# Patient Record
Sex: MALE | Race: WHITE | NOT HISPANIC OR LATINO | Employment: UNEMPLOYED | URBAN - METROPOLITAN AREA
[De-identification: names, ages, dates, MRNs, and addresses within clinical notes are randomized per-mention and may not be internally consistent; named-entity substitution may affect disease eponyms.]

---

## 2017-02-02 ENCOUNTER — TELEPHONE (OUTPATIENT)
Dept: FAMILY MEDICINE CLINIC | Facility: CLINIC | Age: 67
End: 2017-02-02

## 2017-12-08 ENCOUNTER — OFFICE VISIT (OUTPATIENT)
Dept: FAMILY MEDICINE CLINIC | Facility: CLINIC | Age: 67
End: 2017-12-08

## 2017-12-08 VITALS
RESPIRATION RATE: 16 BRPM | HEART RATE: 67 BPM | TEMPERATURE: 97.7 F | SYSTOLIC BLOOD PRESSURE: 150 MMHG | OXYGEN SATURATION: 98 % | HEIGHT: 73 IN | WEIGHT: 187.6 LBS | DIASTOLIC BLOOD PRESSURE: 92 MMHG | BODY MASS INDEX: 24.86 KG/M2

## 2017-12-08 DIAGNOSIS — Z23 NEED FOR INFLUENZA VACCINATION: Primary | ICD-10-CM

## 2017-12-08 DIAGNOSIS — T14.8XXA MUSCLE STRAIN: ICD-10-CM

## 2017-12-08 PROCEDURE — 90662 IIV NO PRSV INCREASED AG IM: CPT | Performed by: NURSE PRACTITIONER

## 2017-12-08 PROCEDURE — 99213 OFFICE O/P EST LOW 20 MIN: CPT | Performed by: NURSE PRACTITIONER

## 2017-12-08 PROCEDURE — G0008 ADMIN INFLUENZA VIRUS VAC: HCPCS | Performed by: NURSE PRACTITIONER

## 2017-12-08 RX ORDER — DICLOFENAC SODIUM 75 MG/1
75 TABLET, DELAYED RELEASE ORAL 2 TIMES DAILY
Qty: 60 TABLET | Refills: 0 | Status: SHIPPED | OUTPATIENT
Start: 2017-12-08 | End: 2018-05-30

## 2017-12-08 NOTE — PROGRESS NOTES
Subjective   Ar Sherman is a 67 y.o. male.   Chief Complaint   Patient presents with   • Muscle Pain     pt states left chest muscle pain and thinks it happened while working out at the gym off and on for awhile      Vitals:    12/08/17 1311   BP: 150/92   Pulse: 67   Resp: 16   Temp: 97.7 °F (36.5 °C)   SpO2: 98%     No LMP for male patient.    History of Present Illness  Ar is a patient of Dr Dougherty who is here for an acute visit. He c/o left chest muscle pain after working out a few weeks ago. It is painful with movement and picking up things. He has used heat with some relief.  He reports he just finished a steroid pack for a sinus infection     The following portions of the patient's history were reviewed and updated as appropriate: allergies, current medications, past family history, past medical history, past social history, past surgical history and problem list.    Review of Systems   Constitutional: Negative.    Respiratory: Negative for cough, chest tightness, shortness of breath and wheezing.    Cardiovascular: Negative for chest pain.   Musculoskeletal: Positive for myalgias (muscle strain of left chest, painful with movement and lifting ).       Objective   Physical Exam   Constitutional: Vital signs are normal. He appears well-developed and well-nourished. No distress.   Cardiovascular: Normal rate.    Pulmonary/Chest: Effort normal. He exhibits tenderness. He exhibits no mass, no crepitus, no edema and no swelling.       Neurological: He is alert.       Assessment/Plan   Ar was seen today for muscle pain.    Diagnoses and all orders for this visit:    Need for influenza vaccination  -     Flu Vaccine High Dose PF 65YR+    Muscle strain    Other orders  -     diclofenac (VOLTAREN) 75 MG EC tablet; Take 1 tablet by mouth 2 (Two) Times a Day. As needed for inflammation. Take with food      Alternate heat and ice  Diclofenac as needed with food  Avoid heaving lifting or weight lifting for  now  He is due for labs and a follow up with Dr Dougherty, recommend that he schedule an appt  Follow up as needed and for continual care

## 2018-05-22 ENCOUNTER — TELEPHONE (OUTPATIENT)
Dept: FAMILY MEDICINE CLINIC | Facility: CLINIC | Age: 68
End: 2018-05-22

## 2018-05-22 NOTE — TELEPHONE ENCOUNTER
5/21/18- I spoke with Mrs. Sherman who stated that patients BP was elevated at 195 while he was at the dentist and his dentist instructed him to call us so he could be seen immediately. I informed her that Dr. Dougherty was not here at the time & there were no openings for him to be worked in. I asked her how he was feeling and she said he felt fine. No other symptoms. I advised her to tell him to go home, take his BP again (she said they have a BP cuff) to see if it came down at all and if so then there wasn't anything else he needed to do but, if it did not go down or if he started feeling badly that he should go to the ER. She understood.   Mrs. Sherman called back a few minutes after me speaking to her insisting that we work patient in to see Dr. Dougherty. She was made aware that he was gone for the day and we would have to get back to them.  5/22/18- Mrs. Sherman called again wanting to know when patient was going to be worked in today. She said his BP came down after he got home yesterday and today it was 126/86 and he feels fine. I let her know again, that we did not have any openings to work him in today and this was not an emergency because his BP is now normal. I told her the first we had available was 5/30/18 with Dr. Dougherty. I advised her that the patient check his BP at least once daily and keep a log of it and bring it with him to his appointment on 5/30/18.     ----- Message from Cordelia Marcos MA sent at 5/21/2018  3:25 PM EDT -----  Patients wife called and wanted to be worked in Tuesday may 22nd due to elevated blood pressure at his dentist appt . Please review and advise

## 2018-05-30 ENCOUNTER — OFFICE VISIT (OUTPATIENT)
Dept: FAMILY MEDICINE CLINIC | Facility: CLINIC | Age: 68
End: 2018-05-30

## 2018-05-30 VITALS
HEIGHT: 73 IN | DIASTOLIC BLOOD PRESSURE: 90 MMHG | SYSTOLIC BLOOD PRESSURE: 140 MMHG | HEART RATE: 95 BPM | RESPIRATION RATE: 17 BRPM | OXYGEN SATURATION: 98 % | WEIGHT: 187 LBS | BODY MASS INDEX: 24.78 KG/M2

## 2018-05-30 DIAGNOSIS — R09.89 LABILE HYPERTENSION: Primary | ICD-10-CM

## 2018-05-30 PROCEDURE — 99213 OFFICE O/P EST LOW 20 MIN: CPT

## 2018-05-30 RX ORDER — BISOPROLOL FUMARATE AND HYDROCHLOROTHIAZIDE 2.5; 6.25 MG/1; MG/1
1 TABLET ORAL DAILY
Qty: 30 TABLET | Refills: 5 | Status: SHIPPED | OUTPATIENT
Start: 2018-05-30 | End: 2018-11-20 | Stop reason: SDUPTHER

## 2018-05-30 NOTE — PROGRESS NOTES
Ramos Sherman is a 68 y.o. male. Patient is here today for   Chief Complaint   Patient presents with   • Hypertension     had an elevated blood pressure in dentist office          Vitals:    05/30/18 1041   BP: 140/90   Pulse: 95   Resp: 17   SpO2: 98%     The following portions of the patient's history were reviewed and updated as appropriate: allergies, current medications, past family history, past medical history, past social history, past surgical history and problem list.    No past medical history on file.   No Known Allergies   Social History     Social History   • Marital status:      Spouse name: N/A   • Number of children: N/A   • Years of education: N/A     Occupational History   • Not on file.     Social History Main Topics   • Smoking status: Never Smoker   • Smokeless tobacco: Never Used   • Alcohol use No   • Drug use: No   • Sexual activity: Yes     Partners: Female     Other Topics Concern   • Not on file     Social History Narrative   • No narrative on file        Current Outpatient Prescriptions:   •  bisoprolol-hydrochlorothiazide (ZIAC) 2.5-6.25 MG per tablet, Take 1 tablet by mouth Daily., Disp: 30 tablet, Rfl: 5     Objective     History of Present Illness   The patient is here today for evaluation of labile blood pressure, significant elevation at his dentist's office    Review of Systems   Constitutional: Negative.    HENT: Negative.    Respiratory: Negative for cough, shortness of breath and wheezing.    Cardiovascular: Negative for chest pain, palpitations and leg swelling.   Gastrointestinal: Negative.    Genitourinary:        The patient does have some mild urinary urgency and frequency which is suggestive of overactive bladder syndrome.  The patient denies urinary hesitancy.  The patient is being followed by urology for history of elevated PSA off and on   Musculoskeletal: Negative.    Neurological: Negative for dizziness, light-headedness and headaches.    Psychiatric/Behavioral: Negative.        Physical Exam   Constitutional: He is oriented to person, place, and time. He appears well-developed and well-nourished.   Eyes: Pupils are equal, round, and reactive to light.   Neck:   Carotid pulses normal   Cardiovascular: Normal rate, regular rhythm and normal heart sounds.    Pulmonary/Chest: Effort normal and breath sounds normal.   Abdominal: Soft. Bowel sounds are normal.   Musculoskeletal: Normal range of motion.   Neurological: He is alert and oriented to person, place, and time.   Skin: Skin is warm and dry.   Psychiatric: He has a normal mood and affect.   Nursing note and vitals reviewed.      ASSESSMENT   #1 labile  Hypertension    DISCUSSION/SUMMARY   Today the patient's blood pressure is 140/90.  When he was at the [facility] for dental work patient's blood pressure was noted to be quite elevated at 160s over 110.  The patient has noted that his blood pressures been somewhat labile over the years.  He  has never been on any kind of medication for his blood pressure elevation.  The patient has been checking his blood pressure at home since this elevation was found at the dentist office.  His blood pressures range from normal to moderately elevated.  Today his blood pressures about 140/90 with a normal pulse rate.  He seems to have a labile hypertension and I am going to start him on generic Ziac 2.5 one daily.  The patient will monitor his blood pressure and pulse rate at home for several weeks and drop his readings off here at the office for my evaluation.    PLAN  Bring blood pressure readings in for my evaluation and review in about 3 weeks.  Later we will set him up for CMP and lipid panel  No Follow-up on file.

## 2018-06-08 ENCOUNTER — TELEPHONE (OUTPATIENT)
Dept: FAMILY MEDICINE CLINIC | Facility: CLINIC | Age: 68
End: 2018-06-08

## 2018-06-08 NOTE — TELEPHONE ENCOUNTER
I called patient last week (6/1/18) and had to leave a voicemail; did not receive a call back.    ----- Message from Cody Dougherty MD sent at 5/31/2018 11:35 AM EDT -----  Tell patient that the medication only has a very tiny dose of a mild diuretic in it.  I think he should let us frequency as time goes by but if this does not improve I will change him to plain bisoprolol without the HCT next week  ----- Message -----  From: Alyce Manzano MA  Sent: 5/31/2018  11:16 AM  To: Cody Dougherty MD        ----- Message -----  From: Griselda Rowell  Sent: 5/31/2018  11:12 AM  To: Alyce Manzano MA    WAS GIVEN A NEW MEDICATION ( BISOPROLOL)YESTERDAY AND A SIDE EFFECT IS GOING TO THE BATHROOM   A LOT  AND HE SAYS HE IS GOING A LOT MORE AND WANTS TO KNOW IF HIS BODY WILL GET USE TO THE MEDICATION AND HE WILL STOP HAVING THIS SIDE EFFECT OR SHOULD ANOTHER RX BE CALLED IN     PLEASE CALL PT TO DISCUSS     [phone]

## 2018-11-21 RX ORDER — BISOPROLOL FUMARATE AND HYDROCHLOROTHIAZIDE 2.5; 6.25 MG/1; MG/1
TABLET ORAL
Qty: 14 TABLET | Refills: 0 | Status: SHIPPED | OUTPATIENT
Start: 2018-11-21 | End: 2018-12-03 | Stop reason: SDUPTHER

## 2018-12-03 RX ORDER — BISOPROLOL FUMARATE AND HYDROCHLOROTHIAZIDE 2.5; 6.25 MG/1; MG/1
1 TABLET ORAL DAILY
Qty: 30 TABLET | Refills: 0 | Status: SHIPPED | OUTPATIENT
Start: 2018-12-03 | End: 2018-12-03 | Stop reason: SDUPTHER

## 2018-12-03 RX ORDER — BISOPROLOL FUMARATE AND HYDROCHLOROTHIAZIDE 2.5; 6.25 MG/1; MG/1
1 TABLET ORAL DAILY
Qty: 30 TABLET | Refills: 0 | Status: SHIPPED | OUTPATIENT
Start: 2018-12-03 | End: 2018-12-04 | Stop reason: SDUPTHER

## 2018-12-04 ENCOUNTER — TELEPHONE (OUTPATIENT)
Dept: FAMILY MEDICINE CLINIC | Facility: CLINIC | Age: 68
End: 2018-12-04

## 2018-12-04 RX ORDER — BISOPROLOL FUMARATE AND HYDROCHLOROTHIAZIDE 2.5; 6.25 MG/1; MG/1
1 TABLET ORAL DAILY
Qty: 90 TABLET | Refills: 0 | Status: SHIPPED | OUTPATIENT
Start: 2018-12-04 | End: 2019-04-01 | Stop reason: SDUPTHER

## 2018-12-04 NOTE — TELEPHONE ENCOUNTER
I spoke with patient and let him know I sent a refill on his Ziac to his pharmacy yesterday and that Dr. Dougherty wants him to continue this medication. I also let him know he will be due for labs and a follow up soon. He said he will be going out of town until March and I told him that it was OK to wait until he gets back. Sent a 90-day supply today to last him until then.    ----- Message from Valerio Castellanos sent at 12/3/2018 12:48 PM EST -----  PT CALLED IN STATING HE DROPPED OFF BP LOG PER DR MARSHALL IN ORDER TO GET A REFILL ON HIS bisoprolol-hydrochlorothiazide (ZIAC) 2.5-6.25 MG.  I SEE WE GAVE HIM A 14 DAY SUPPLY ON 11/21/18 BUT NO NOTES INDICATING ANYTHING.  PLEASE CONTACT PT -[phone]

## 2018-12-04 NOTE — TELEPHONE ENCOUNTER
Done     ----- Message from Cody Dougherty MD sent at 12/3/2018  3:16 PM EST -----  Tell patient that his blood pressure readings look good so he should continue his present medication.  ----- Message -----  From: Alyce Manzano MA  Sent: 12/3/2018   1:26 PM  To: Cody Dougherty MD

## 2019-04-01 RX ORDER — BISOPROLOL FUMARATE AND HYDROCHLOROTHIAZIDE 2.5; 6.25 MG/1; MG/1
1 TABLET ORAL DAILY
Qty: 90 TABLET | Refills: 0 | Status: SHIPPED | OUTPATIENT
Start: 2019-04-01 | End: 2019-07-05 | Stop reason: SDUPTHER

## 2019-04-01 RX ORDER — BISOPROLOL FUMARATE AND HYDROCHLOROTHIAZIDE 2.5; 6.25 MG/1; MG/1
1 TABLET ORAL DAILY
Qty: 90 TABLET | Refills: 0 | Status: SHIPPED | OUTPATIENT
Start: 2019-04-01 | End: 2019-04-01 | Stop reason: SDUPTHER

## 2019-05-28 ENCOUNTER — OFFICE VISIT (OUTPATIENT)
Dept: FAMILY MEDICINE CLINIC | Facility: CLINIC | Age: 69
End: 2019-05-28

## 2019-05-28 VITALS
SYSTOLIC BLOOD PRESSURE: 146 MMHG | WEIGHT: 183.6 LBS | TEMPERATURE: 98.1 F | BODY MASS INDEX: 24.33 KG/M2 | DIASTOLIC BLOOD PRESSURE: 96 MMHG | HEIGHT: 73 IN | OXYGEN SATURATION: 98 % | RESPIRATION RATE: 16 BRPM | HEART RATE: 67 BPM

## 2019-05-28 DIAGNOSIS — Z11.59 NEED FOR HEPATITIS C SCREENING TEST: ICD-10-CM

## 2019-05-28 DIAGNOSIS — H93.12 TINNITUS OF LEFT EAR: ICD-10-CM

## 2019-05-28 DIAGNOSIS — Z87.898 HISTORY OF ELEVATED PSA: ICD-10-CM

## 2019-05-28 DIAGNOSIS — Z12.5 ENCOUNTER FOR SCREENING FOR MALIGNANT NEOPLASM OF PROSTATE: ICD-10-CM

## 2019-05-28 DIAGNOSIS — I10 ESSENTIAL HYPERTENSION: Primary | ICD-10-CM

## 2019-05-28 PROCEDURE — 99213 OFFICE O/P EST LOW 20 MIN: CPT

## 2019-05-28 NOTE — PROGRESS NOTES
Subjective   Ar Sherman is a 69 y.o. male. Patient is here today for   Chief Complaint   Patient presents with   • Hypertension          Vitals:    05/28/19 0821   BP: 146/96   Pulse: 67   Resp: 16   Temp: 98.1 °F (36.7 °C)   SpO2: 98%     The following portions of the patient's history were reviewed and updated as appropriate: allergies, current medications, past family history, past medical history, past social history, past surgical history and problem list.    History reviewed. No pertinent past medical history.   No Known Allergies   Social History     Socioeconomic History   • Marital status:      Spouse name: Not on file   • Number of children: Not on file   • Years of education: Not on file   • Highest education level: Not on file   Tobacco Use   • Smoking status: Never Smoker   • Smokeless tobacco: Never Used   Substance and Sexual Activity   • Alcohol use: No   • Drug use: No   • Sexual activity: Yes     Partners: Female        Current Outpatient Medications:   •  bisoprolol-hydrochlorothiazide (ZIAC) 2.5-6.25 MG per tablet, Take 1 tablet by mouth Daily., Disp: 90 tablet, Rfl: 0     Objective     History of Present Illness   The patient is here today for follow-up on  labile hypertension     Review of Systems   Constitutional: Negative for chills, fatigue, fever and unexpected weight change.        The patient does work out on a regular basis.  His diet is good.   HENT:        The patient does have some mild ringing in his left ear.  He denies ear ache.  He really does not have any major upper respiratory allergy symptoms.  No dizziness.   Respiratory: Negative for cough, shortness of breath and wheezing.    Cardiovascular: Negative for chest pain, palpitations and leg swelling.   Gastrointestinal: Negative.    Genitourinary:        Minimal urinary hesitancy and nocturia   Neurological: Negative.    Hematological: Negative.    Psychiatric/Behavioral:        Patient states that he is always been  fairly high strung but has never had to take any type of medication for this.       Physical Exam   Constitutional: He is oriented to person, place, and time. He appears well-developed and well-nourished.   HENT:   Right Ear: External ear normal.   Left Ear: External ear normal.   Nose: Nose normal.   Mouth/Throat: Oropharynx is clear and moist.   Eyes: Pupils are equal, round, and reactive to light.   Neck:   Carotid pulses normal   Cardiovascular: Normal rate, regular rhythm and normal heart sounds.   Pulmonary/Chest: Effort normal and breath sounds normal. No stridor. He has no wheezes. He has no rales.   Musculoskeletal: Normal range of motion. He exhibits no edema.   Neurological: He is alert and oriented to person, place, and time.   Skin: Skin is warm and dry.   Psychiatric: He has a normal mood and affect.   Nursing note and vitals reviewed.      ASSESSMENT  #1 labile hypertension                    #2 history of elevated PSA                #3 tinnitus                      DISCUSSION/SUMMARY   Initially the patient's blood pressure was 146/96.  Upon recheck his blood pressure was down to 140/86.  The patient states that his blood pressure varies quite a lot at home when he checks it.  He usually has readings of 130s systolic and 70s diastolic.    The patient complains of some ringing in his left ear but denies any dizziness.  No significant  hearing loss.  If the patient's tinnitus worsens I will send him to ENT for evaluation.    The patient was sent to urology in 2016 for a mildly elevated PSA and was found to have BPH and a repeat PSA came down to below 4.  He has not had this checked in over a year so we will need to do a PSA today.  I am also going to order a CMP and hepatitis C screening.    PLAN  Continue generic Ziac 2.51 daily.  CMP, hepatitis C and PSA to be drawn today.  No Follow-up on file.

## 2019-05-29 LAB
ALBUMIN SERPL-MCNC: 4.2 G/DL (ref 3.5–5.2)
ALBUMIN/GLOB SERPL: 1.8 G/DL
ALP SERPL-CCNC: 40 U/L (ref 39–117)
ALT SERPL-CCNC: 12 U/L (ref 1–41)
AST SERPL-CCNC: 16 U/L (ref 1–40)
BILIRUB SERPL-MCNC: 0.5 MG/DL (ref 0.2–1.2)
BUN SERPL-MCNC: 23 MG/DL (ref 8–23)
BUN/CREAT SERPL: 18.1 (ref 7–25)
CALCIUM SERPL-MCNC: 10.2 MG/DL (ref 8.6–10.5)
CHLORIDE SERPL-SCNC: 105 MMOL/L (ref 98–107)
CO2 SERPL-SCNC: 28.8 MMOL/L (ref 22–29)
CREAT SERPL-MCNC: 1.27 MG/DL (ref 0.76–1.27)
GLOBULIN SER CALC-MCNC: 2.3 GM/DL
GLUCOSE SERPL-MCNC: 95 MG/DL (ref 65–99)
HCV AB S/CO SERPL IA: <0.1 S/CO RATIO (ref 0–0.9)
HCV AB SERPL QL IA: NORMAL
POTASSIUM SERPL-SCNC: 4.6 MMOL/L (ref 3.5–5.2)
PROT SERPL-MCNC: 6.5 G/DL (ref 6–8.5)
PSA SERPL-MCNC: 4.33 NG/ML (ref 0–4)
SODIUM SERPL-SCNC: 144 MMOL/L (ref 136–145)

## 2019-06-17 ENCOUNTER — TELEPHONE (OUTPATIENT)
Dept: FAMILY MEDICINE CLINIC | Facility: CLINIC | Age: 69
End: 2019-06-17

## 2019-06-17 NOTE — TELEPHONE ENCOUNTER
CALLED AND S/W PT AND ADVISED WHAT DR. DELVALLE SAID. PT VOICED UNDERSTANDING AND WILL MAKE SURE HE RECHECKS YEARLY.     ----- Message from Cody Delvalle MD sent at 6/14/2019  2:23 PM EDT -----  I cannot remember if the patient was informed of results of his lab test.  Tell him that his lab work was normal except for minimal elevation of his PSA again.  His PSA was borderline elevated at 4.330 which is down from 5.410  2 years ago.  Tell him that he needs to repeat his PSA every year.

## 2019-07-05 ENCOUNTER — TELEPHONE (OUTPATIENT)
Dept: FAMILY MEDICINE CLINIC | Facility: CLINIC | Age: 69
End: 2019-07-05

## 2019-07-05 RX ORDER — BISOPROLOL FUMARATE AND HYDROCHLOROTHIAZIDE 2.5; 6.25 MG/1; MG/1
1 TABLET ORAL DAILY
Qty: 90 TABLET | Refills: 0 | Status: SHIPPED | OUTPATIENT
Start: 2019-07-05 | End: 2019-10-02 | Stop reason: SDUPTHER

## 2019-07-05 NOTE — TELEPHONE ENCOUNTER
SENT RX TO PHARMACY FOR PATIENT.     ----- Message from Valerio Castellanos sent at 7/3/2019  3:28 PM EDT -----  PT NEEDS SCRIPT REFILL FOR bisoprolol-hydrochlorothiazide (ZIAC) 2.5-6.25 MG Take 1 tablet by mouth Daily #90    PT NEEDS SCRIPT SENT TO [facility] ON [address] [place] [facility] [place]     IF YOU HAVE ANY QUESTIONS PLEASE CONTACT PT -[phone]

## 2019-10-02 RX ORDER — BISOPROLOL FUMARATE AND HYDROCHLOROTHIAZIDE 2.5; 6.25 MG/1; MG/1
1 TABLET ORAL DAILY
Qty: 90 TABLET | Refills: 0 | Status: SHIPPED | OUTPATIENT
Start: 2019-10-02 | End: 2019-12-05 | Stop reason: SDUPTHER

## 2019-12-05 ENCOUNTER — TELEPHONE (OUTPATIENT)
Dept: INTERNAL MEDICINE | Facility: CLINIC | Age: 69
End: 2019-12-05

## 2019-12-05 RX ORDER — BISOPROLOL FUMARATE AND HYDROCHLOROTHIAZIDE 2.5; 6.25 MG/1; MG/1
1 TABLET ORAL DAILY
Qty: 90 TABLET | Refills: 1 | Status: SHIPPED | OUTPATIENT
Start: 2019-12-05

## 2019-12-05 NOTE — TELEPHONE ENCOUNTER
Suzi,    Per our conversation earlier Mr. Sherman request a refill for bisoprolol-hydrochlorothiazide (ZIAC) 2.5-6.25 MG per tablet. He is almost out and has no refills. The patient will be leaving soon for [place] and will not return until March 2020. He is now scheduled for labs 03/11/20 and f/u appt to see Marlen 03/18/20. Let me know if you need anything else.    Thanks,    Renuka

## 2020-02-03 ENCOUNTER — TELEPHONE (OUTPATIENT)
Dept: INTERNAL MEDICINE | Facility: CLINIC | Age: 70
End: 2020-02-03

## 2020-02-03 NOTE — TELEPHONE ENCOUNTER
Patient called, he is currently in [place]. He was scheduled to come back to [place] in March but now won't be back until May due to having radiation every day.  His blood pressure has been elevated, and she started taking his BP medications twice daily as previously instructed by doctor Farhat.   He said his BP is not under control since he has been taking his Bisoprolol/Htcz twice daily. He will be running out of his medication sooner and wanted us to call in a new prescription to his pharmacy for twice daily.  I spoke with Marlen MURO and unfortunately she can not send in a prescription for the patient since she has not seen him in over two years, and has not seen him for HTN.   I called the patient, apologized and notified him. He was not very happy since he could not help his circumstances, I told him he can go to a clinic in [place] while he is there to get the prescription for twice a day and once he is back in [place] we will see him and write it for him. He did not like that answer. He said once he comes back to [place], he will find himself a new physician.

## 2020-03-05 DIAGNOSIS — R09.89 LABILE HYPERTENSION: Primary | ICD-10-CM

## 2020-03-11 ENCOUNTER — RESULTS ENCOUNTER (OUTPATIENT)
Dept: INTERNAL MEDICINE | Facility: CLINIC | Age: 70
End: 2020-03-11

## 2020-03-11 DIAGNOSIS — R09.89 LABILE HYPERTENSION: ICD-10-CM

## 2022-11-08 NOTE — TELEPHONE ENCOUNTER
LAB ORDER FAXED    ----- Message from Suzi Barraza MA sent at 2/1/2017 10:18 AM EST -----  NEEDS LAB ORDER FOR PSA FAXED TO [facility] IN [place]   FAX : [phone]    PLEASE CALL HIM WHEN YOU HAVE FAXED IT SO HE CAN GO OVER THERE.     THANK YOU      no rashes , no suspicious lesions , no areas of discoloration , no jaundice present , good turgor , no masses , no tenderness on palpation